# Patient Record
Sex: FEMALE | Race: WHITE | Employment: FULL TIME | URBAN - METROPOLITAN AREA
[De-identification: names, ages, dates, MRNs, and addresses within clinical notes are randomized per-mention and may not be internally consistent; named-entity substitution may affect disease eponyms.]

---

## 2017-01-02 DIAGNOSIS — I10 HYPERTENSION GOAL BP (BLOOD PRESSURE) < 140/90: Primary | ICD-10-CM

## 2017-01-02 RX ORDER — LISINOPRIL 10 MG/1
10 TABLET ORAL DAILY
Qty: 15 TABLET | Refills: 0 | Status: SHIPPED | OUTPATIENT
Start: 2017-01-02 | End: 2017-01-11

## 2017-01-02 NOTE — TELEPHONE ENCOUNTER
lisinopril (PRINIVIL,ZESTRIL) 10 MG tablet      Last Written Prescription Date: 11/26/16  Last Fill Quantity: 30, # refills: 0  Last Office Visit with G, P or [facility] prescribing provider: 3/8/16       POTASSIUM   Date Value Ref Range Status   01/15/2015 3.9 3.4 - 5.3 mmol/L Final     CREATININE   Date Value Ref Range Status   01/15/2015 0.81 0.52 - 1.04 mg/dL Final     BP Readings from Last 3 Encounters:   08/22/16 121/87   03/08/16 180/100   08/06/15 139/84

## 2017-01-02 NOTE — TELEPHONE ENCOUNTER
Routing refill request to provider for review/approval because:  Elva given x1 and patient did not follow up, please advise. Unable to reach patient per last refill request.  Please contact patient to schedule an appointment.   Isis Heck RN  Triage Nurse

## 2017-01-11 ENCOUNTER — DOCUMENTATION ONLY (OUTPATIENT)
Dept: FAMILY MEDICINE | Facility: CLINIC | Age: 57
End: 2017-01-11

## 2017-01-11 ENCOUNTER — OFFICE VISIT (OUTPATIENT)
Dept: FAMILY MEDICINE | Facility: CLINIC | Age: 57
End: 2017-01-11
Payer: COMMERCIAL

## 2017-01-11 VITALS
SYSTOLIC BLOOD PRESSURE: 130 MMHG | HEART RATE: 86 BPM | TEMPERATURE: 97.2 F | WEIGHT: 146.56 LBS | RESPIRATION RATE: 16 BRPM | OXYGEN SATURATION: 97 % | DIASTOLIC BLOOD PRESSURE: 72 MMHG | HEIGHT: 63 IN | BODY MASS INDEX: 25.97 KG/M2

## 2017-01-11 DIAGNOSIS — I10 HYPERTENSION GOAL BP (BLOOD PRESSURE) < 140/90: ICD-10-CM

## 2017-01-11 DIAGNOSIS — R73.01 IMPAIRED FASTING GLUCOSE: ICD-10-CM

## 2017-01-11 LAB — HBA1C MFR BLD: 5.2 % (ref 4.3–6)

## 2017-01-11 PROCEDURE — 99213 OFFICE O/P EST LOW 20 MIN: CPT | Performed by: PHYSICIAN ASSISTANT

## 2017-01-11 PROCEDURE — 82043 UR ALBUMIN QUANTITATIVE: CPT | Performed by: FAMILY MEDICINE

## 2017-01-11 PROCEDURE — 83036 HEMOGLOBIN GLYCOSYLATED A1C: CPT | Performed by: FAMILY MEDICINE

## 2017-01-11 PROCEDURE — 80053 COMPREHEN METABOLIC PANEL: CPT | Performed by: FAMILY MEDICINE

## 2017-01-11 PROCEDURE — 36415 COLL VENOUS BLD VENIPUNCTURE: CPT | Performed by: FAMILY MEDICINE

## 2017-01-11 RX ORDER — LISINOPRIL 10 MG/1
10 TABLET ORAL DAILY
Qty: 90 TABLET | Refills: 1 | Status: SHIPPED | OUTPATIENT
Start: 2017-01-11 | End: 2017-07-20

## 2017-01-11 NOTE — NURSING NOTE
"Chief Complaint   Patient presents with     Hypertension     Hypertension Follow Up        Initial /72 mmHg  Pulse 86  Temp(Src) 97.2  F (36.2  C) (Tympanic)  Resp 16  Ht 5' 2.5\" (1.588 m)  Wt 146 lb 9 oz (66.48 kg)  BMI 26.36 kg/m2  SpO2 97%  Breastfeeding? No Estimated body mass index is 26.36 kg/(m^2) as calculated from the following:    Height as of this encounter: 5' 2.5\" (1.588 m).    Weight as of this encounter: 146 lb 9 oz (66.48 kg).  BP completed using cuff size: large    Patient would like to be notified at the following phone number for results from this visit   [phone] OK to leave message or use Sharda Arias CMA (AAMA) 1/11/2017 11:20 AM      "

## 2017-01-11 NOTE — PROGRESS NOTES
Panel Management Review      Patient has the following on her problem list:     Hypertension   Last three blood pressure readings:  BP Readings from Last 3 Encounters:   08/22/16 121/87   03/08/16 180/100   08/06/15 139/84     Blood pressure: Failed    HTN Guidelines:  Age 18-59 BP range:  Less than 140/90  Age 60-85 with Diabetes:  Less than 140/90  Age 60-85 without Diabetes:  less than 150/90      Composite cancer screening  Chart review shows that this patient is due/due soon for the following Pap Smear and Mammogram  Summary:    Patient is due/failing the following:   MAMMOGRAM and PAP    Action needed:   Patient needs office visit for Mammogram & PAP.    Type of outreach:    Discussed in clinic with patient at time of office visit. Patient aware of need for Mammogram & PAP, did not want orders placed for Mammogram at this time due to work. Patient will schedule PAP with primary provider.    Questions for provider review:    None                                                                                                                                    Aster Arias CMA (AAMA) 1/11/2017 11:24 AM       Chart routed to N/A .

## 2017-01-11 NOTE — PROGRESS NOTES
SUBJECTIVE:                                                    Felecia Anne is a 56 year old female who presents to clinic today for the following health issues:      Hypertension Follow-up      Outpatient blood pressures are being checked at pharmacy at work.  Results are 135/75.    Low Salt Diet: Monitoring Salt Intake       Amount of exercise or physical activity: 4-5 days/week for an average of 45-60 minutes    Problems taking medications regularly: No    Medication side effects: none    Diet: low salt    Patient is here today to follow up on blood pressure  She is currently on Lisinopril for medication, tolerating well without side effects  No chest pain, shortness of breath, cough, headache or vision change, no leg swelling  Checks BP at work, gets 135/75 on average she states      Problem list and histories reviewed & adjusted, as indicated.  Additional history: as documented    Patient Active Problem List   Diagnosis     Hypertension goal BP (blood pressure) < 140/90     H. pylori infection     Lactose intolerance     CARDIOVASCULAR SCREENING; LDL GOAL LESS THAN 160     Impaired fasting glucose     Menopausal syndrome (hot flashes)     Increased BMI     History reviewed. No pertinent past surgical history.    Social History   Substance Use Topics     Smoking status: Current Every Day Smoker -- 1.00 packs/day for 33 years     Types: Cigarettes     Smokeless tobacco: Never Used     Alcohol Use: 12.6 oz/week     21 Standard drinks or equivalent per week      Comment: limited     Family History   Problem Relation Age of Onset     Hypertension Brother      Cancer - colorectal Mother          Current Outpatient Prescriptions   Medication Sig Dispense Refill     lisinopril (PRINIVIL/ZESTRIL) 10 MG tablet Take 1 tablet (10 mg) by mouth daily 90 tablet 1     FLUoxetine (PROZAC) 20 MG capsule Take 1 capsule (20 mg) by mouth daily 90 capsule 1     [DISCONTINUED] lisinopril (PRINIVIL/ZESTRIL) 10 MG tablet Take 1  "tablet (10 mg) by mouth daily 15 tablet 0     methocarbamol (ROBAXIN) 500 MG tablet 1-2 tabs q TID PRN for muscle spasm/pain 24 tablet 0     No Known Allergies    ROS:  Constitutional, HEENT, cardiovascular, pulmonary, gi and gu systems are negative, except as otherwise noted.    OBJECTIVE:                                                    /72 mmHg  Pulse 86  Temp(Src) 97.2  F (36.2  C) (Tympanic)  Resp 16  Ht 5' 2.5\" (1.588 m)  Wt 146 lb 9 oz (66.48 kg)  BMI 26.36 kg/m2  SpO2 97%  Breastfeeding? No  Body mass index is 26.36 kg/(m^2).  GENERAL: healthy, alert and no distress  NECK: no adenopathy, no asymmetry, masses, or scars and thyroid normal to palpation  RESP: lungs clear to auscultation - no rales, rhonchi or wheezes  CV: regular rate and rhythm, normal S1 S2, no S3 or S4, no murmur, click or rub, no peripheral edema and peripheral pulses strong  MS: no gross musculoskeletal defects noted, no edema    Diagnostic Test Results:  none      ASSESSMENT/PLAN:                                                            1. Hypertension goal BP (blood pressure) < 140/90  Chronic issue, stable, meds refilled and labs updated today.   - Comprehensive metabolic panel (BMP + Alb, Alk Phos, ALT, AST, Total. Bili, TP)  - Microalbumin quantitative, random urine  - lisinopril (PRINIVIL/ZESTRIL) 10 MG tablet; Take 1 tablet (10 mg) by mouth daily  Dispense: 90 tablet; Refill: 1    2. Impaired fasting glucose  - Hemoglobin A1c  - Comprehensive metabolic panel (BMP + Alb, Alk Phos, ALT, AST, Total. Bili, TP)  - Microalbumin quantitative, random urine    Risks, benefits and alternatives were discussed with patient. Agreeable to the plan of care.  Due for physical, HM, she will schedule this year.    Almita Mujica PA-C  [facility]"

## 2017-01-11 NOTE — MR AVS SNAPSHOT
After Visit Summary   1/11/2017    Felecia Anne    MRN: [id number]           Patient Information     Date Of Birth          1960        Visit Information        Provider Department      1/11/2017 11:30 AM Almita Mujica PA-C [facility]        Today's Diagnoses     Hypertension goal BP (blood pressure) < 140/90         Impaired fasting glucose            Follow-ups after your visit        Your next 10 appointments already scheduled     Jan 11, 2017 11:30 AM   Office Visit with Almita Mujica PA-C   [facility] ([facility]    [address] [address] [phone]           Bring a current list of meds and any records pertaining to this visit.  For Physicals, please bring immunization records and any forms needing to be filled out.  Please arrive 10 minutes early to complete paperwork.              Who to contact     If you have questions or need follow up information about today's clinic visit or your schedule please contact [facility] directly at [phone].  Normal or non-critical lab and imaging results will be communicated to you by [employer], letter or phone within 4 business days after the clinic has received the results. If you do not hear from us within 7 days, please contact the clinic through [employer] or phone. If you have a critical or abnormal lab result, we will notify you by phone as soon as possible.  Submit refill requests through [employer] or call your pharmacy and they will forward the refill request to us. Please allow 3 business days for your refill to be completed.          Additional Information About Your Visit        MyChart Information     [employer] gives you secure access to your electronic health record. If you see a primary care provider, you can also send messages to your care team and make appointments. If you have questions, please call your primary care clinic.   "If you do not have a primary care provider, please call [phone] and they will assist you.        Care EveryWhere ID     This is your Care EveryWhere ID. This could be used by other organizations to access your [place] medical records  [id number]        Your Vitals Were     Pulse Temperature Respirations    86 97.2  F (36.2  C) (Tympanic) 16    Height BMI (Body Mass Index) Pulse Oximetry    5' 2.5\" (1.588 m) 26.36 kg/m2 97%    Breastfeeding?          No         Blood Pressure from Last 3 Encounters:   01/11/17 130/72   08/22/16 121/87   03/08/16 180/100    Weight from Last 3 Encounters:   01/11/17 146 lb 9 oz (66.48 kg)   03/08/16 152 lb 14.4 oz (69.355 kg)   08/06/15 156 lb 11.2 oz (71.079 kg)              We Performed the Following     Comprehensive metabolic panel (BMP + Alb, Alk Phos, ALT, AST, Total. Bili, TP)     Hemoglobin A1c     Microalbumin quantitative, random urine          Where to get your medicines      These medications were sent to [id number] - [place], [place] - [address]  [address] [address]     Phone:  [phone]    - lisinopril 10 MG tablet       Primary Care Provider Office Phone # Fax #    Chasidy Kapoor [phone]       [facility] [address] [address]        Thank you!     Thank you for choosing [facility]  for your care. Our goal is always to provide you with excellent care. Hearing back from our patients is one way we can continue to improve our services. Please take a few minutes to complete the written survey that you may receive in the mail after your visit with us. Thank you!             Your Updated Medication List - Protect others around you: Learn how to safely use, store and throw away your medicines at www.disposemymeds.org.          This list is accurate as of: 1/11/17 11:26 AM.  Always use your most recent med list.                   Brand Name Dispense Instructions for use    " FLUoxetine 20 MG capsule    PROzac    90 capsule    Take 1 capsule (20 mg) by mouth daily       lisinopril 10 MG tablet    PRINIVIL/ZESTRIL    90 tablet    Take 1 tablet (10 mg) by mouth daily       methocarbamol 500 MG tablet    ROBAXIN    24 tablet    1-2 tabs q TID PRN for muscle spasm/pain

## 2017-01-12 LAB
ALBUMIN SERPL-MCNC: 3.6 G/DL (ref 3.4–5)
ALP SERPL-CCNC: 112 U/L (ref 40–150)
ALT SERPL W P-5'-P-CCNC: 22 U/L (ref 0–50)
ANION GAP SERPL CALCULATED.3IONS-SCNC: 7 MMOL/L (ref 3–14)
AST SERPL W P-5'-P-CCNC: 14 U/L (ref 0–45)
BILIRUB SERPL-MCNC: 0.3 MG/DL (ref 0.2–1.3)
BUN SERPL-MCNC: 10 MG/DL (ref 7–30)
CALCIUM SERPL-MCNC: 9.2 MG/DL (ref 8.5–10.1)
CHLORIDE SERPL-SCNC: 107 MMOL/L (ref 94–109)
CO2 SERPL-SCNC: 27 MMOL/L (ref 20–32)
CREAT SERPL-MCNC: 0.73 MG/DL (ref 0.52–1.04)
CREAT UR-MCNC: 258 MG/DL
GFR SERPL CREATININE-BSD FRML MDRD: 82 ML/MIN/1.7M2
GLUCOSE SERPL-MCNC: 102 MG/DL (ref 70–99)
MICROALBUMIN UR-MCNC: 15 MG/L
MICROALBUMIN/CREAT UR: 5.74 MG/G CR (ref 0–25)
POTASSIUM SERPL-SCNC: 3.7 MMOL/L (ref 3.4–5.3)
PROT SERPL-MCNC: 7 G/DL (ref 6.8–8.8)
SODIUM SERPL-SCNC: 141 MMOL/L (ref 133–144)

## 2017-03-08 DIAGNOSIS — F43.9 STRESS: ICD-10-CM

## 2017-03-08 DIAGNOSIS — N95.1 MENOPAUSAL SYNDROME (HOT FLASHES): ICD-10-CM

## 2017-03-09 NOTE — TELEPHONE ENCOUNTER
FLUoxetine (PROZAC) 20 MG     Last Written Prescription Date: 9/1/16  Last Fill Quantity: 90, # refills: 1  Last Office Visit with G primary care provider:  1/11/17        Last PHQ-9 score on record=   PHQ-9 SCORE 3/8/2016   Total Score -   Total Score 5

## 2017-03-10 NOTE — TELEPHONE ENCOUNTER
Prescription approved per [facility] Refill Protocol.  Isis Heck, RN  Triage Nurse

## 2017-04-24 ENCOUNTER — OFFICE VISIT (OUTPATIENT)
Dept: PEDIATRICS | Facility: CLINIC | Age: 57
End: 2017-04-24
Payer: COMMERCIAL

## 2017-04-24 ENCOUNTER — RADIANT APPOINTMENT (OUTPATIENT)
Dept: GENERAL RADIOLOGY | Facility: CLINIC | Age: 57
End: 2017-04-24
Attending: PHYSICIAN ASSISTANT
Payer: COMMERCIAL

## 2017-04-24 VITALS
RESPIRATION RATE: 20 BRPM | DIASTOLIC BLOOD PRESSURE: 60 MMHG | SYSTOLIC BLOOD PRESSURE: 114 MMHG | HEART RATE: 71 BPM | BODY MASS INDEX: 26.63 KG/M2 | HEIGHT: 63 IN | WEIGHT: 150.3 LBS | OXYGEN SATURATION: 94 % | TEMPERATURE: 98 F

## 2017-04-24 DIAGNOSIS — R05.9 COUGH: ICD-10-CM

## 2017-04-24 DIAGNOSIS — J20.9 ACUTE BRONCHITIS, UNSPECIFIED ORGANISM: Primary | ICD-10-CM

## 2017-04-24 PROCEDURE — 99214 OFFICE O/P EST MOD 30 MIN: CPT | Performed by: PHYSICIAN ASSISTANT

## 2017-04-24 PROCEDURE — 71020 XR CHEST 2 VW: CPT

## 2017-04-24 RX ORDER — AZITHROMYCIN 250 MG/1
TABLET, FILM COATED ORAL
Qty: 6 TABLET | Refills: 0 | Status: SHIPPED | OUTPATIENT
Start: 2017-04-24 | End: 2017-05-12

## 2017-04-24 NOTE — PROGRESS NOTES
"  SUBJECTIVE:                                                    Felecia Anne is a 57 year old female who presents to clinic today for the following health issues:    Acute Illness   Acute illness concerns: possible bronchitis   Onset: approximately 1 month ago    Fever: no     Chills/Sweats: no     Headache (location?): YES- with coughing fits    Sinus Pressure:no    Conjunctivitis:  no    Ear Pain: no    Rhinorrhea: YES- little    Congestion: YES    Sore Throat: YES- from coughing     Cough: YES-productive of milky sputum    Wheeze: no     Worse at night    Decreased Appetite: no     Nausea: no     Vomiting: YES- with coughing fits    Diarrhea:  no     Dysuria/Freq.: no     Fatigue/Achiness: YES- fatigue and achy    Sick/Strep Exposure: no      Therapies Tried and outcome: Cough drops and Mucinex  Smoker: <1ppd  Work: .  No history of asthma, allergies.     ROS:  ROS otherwise negative    OBJECTIVE:                                                    /60 (BP Location: Right arm, Cuff Size: Adult Regular)  Pulse 71  Temp 98  F (36.7  C) (Oral)  Resp 20  Ht 5' 2.5\" (1.588 m)  Wt 150 lb 4.8 oz (68.2 kg)  SpO2 94%  BMI 27.05 kg/m2  Body mass index is 27.05 kg/(m^2).   GENERAL: alert, no distress  HENT: ear canals- normal; TMs- normal; Nose- normal; Mouth- no ulcers, no lesions  NECK: no tenderness, no adenopathy  RESP: diminished breath sounds throughout; no rales, no rhonchi, no wheezes  CV: regular rates and rhythm, normal S1 S2, no S3 or S4 and no murmur, no click or rub    Diagnostic test results:  CXR appears NEGATIVE. Radiology review pending.  No results found for this or any previous visit (from the past 24 hour(s)).     ASSESSMENT/PLAN:                                                    (J20.9) Acute bronchitis, unspecified organism  (primary encounter diagnosis)  Comment: begin antibiotics. Limit tobacco use.   Plan: azithromycin (ZITHROMAX) 250 MG tablet          (R05) " Cough  Comment:   Plan: XR Chest 2 Views          See Patient Instructions    Perla Gonzalez PA-C  [facility]

## 2017-04-24 NOTE — PATIENT INSTRUCTIONS
Acute Bronchitis                  What is acute bronchitis?   Bronchitis is an infection of the air passages--that is, the tubes that connect the windpipe to the lungs. It causes swelling and irritation of the airways. With acute bronchitis you usually have a cough that produces phlegm and pain behind the breastbone when you breathe deeply or cough.   How does it occur?   Bronchitis often occurs with viral infections of the respiratory tract, such as colds and flu. Bronchitis may also be caused by bacterial infections. It may occur with childhood illnesses such as measles and whooping cough.   Attacks are most frequent during the winter or when the level of air pollution is high.   Infants, young children, older adults, smokers, and people with heart disease or lung disease (including asthma and allergies) are most likely to get acute bronchitis.   What are the symptoms?   Symptoms may include:   a deep cough that produces yellowish or greenish phlegm   pain behind the breastbone when you breathe deeply or cough   wheezing   feeling short of breath   fever   chills   headache   sore muscles.   How is it diagnosed?   Your healthcare provider will ask about your symptoms and examine you. You may have tests, such as:   a test of phlegm to look for bacteria   chest X-ray   blood tests.   How is it treated?   Acute bronchitis often does not require medical treatment. Resting at home and drinking plenty of fluids to keep the mucus loose may be all you need to do to get better in a few days. If your symptoms are severe or you have other health problems (such as heart or lung disease or diabetes), you may need to take antibiotics.   How long will the effects last?   Most of the time acute bronchitis clears up in a few days. Your cough may slowly get better in 1 to 2 weeks.   It may take you longer to recover if:   You are a smoker.   You live in an area where air pollution is a problem.   You have a heart  or lung disease.   You have any other continuing health problems.   How can I take care of myself?   You can help yourself by:   following the full treatment your healthcare provider recommends   using a vaporizer, humidifier, or steam from hot water to add moisture to the air   drinking plenty of liquids   taking cough medicine if recommended by your healthcare provider   resting in bed   taking aspirin or acetaminophen to reduce fever and relieve headache and muscle pain (Check with your healthcare provider before you give any medicine that contains aspirin or salicylates to a child or teen. This includes medicines like baby aspirin, some cold medicines, and Pepto Bismol. Children and teens who take aspirin are at risk for a serious illness called Reye's syndrome.)   eating healthy meals.   Call your healthcare provider if:   You have trouble breathing.   You have a fever of 101.5?F (38.6?C) or higher.   You cough up blood.   Your symptoms are getting worse instead of better.   You don't start to feel better after 3 days of treatment.   You have any symptoms that concern you.   How can I help prevent acute bronchitis?   To reduce your risk of getting a respiratory infection:   Do not smoke.   Wash your hands often, especially when you are around people with colds (upper respiratory infections).   If you have asthma or allergies, keep your symptoms under good control.   Get regular exercise.   Eat healthy foods.       Published by [employer]  This content is reviewed periodically and is subject to change as new health information becomes available. The information is intended to inform and educate and is not a replacement for medical evaluation, advice, diagnosis or treatment by a healthcare professional.   Developed by [employer]   ? 2010 [employer] and/or its affiliates. All Rights Reserved.   Copyright   [employer] 2011

## 2017-04-24 NOTE — MR AVS SNAPSHOT
After Visit Summary   4/24/2017    Felecia Anne    MRN: [id number]           Patient Information     Date Of Birth          1960        Visit Information        Provider Department      4/24/2017 3:50 PM Perla Gonzalez PA-C [facility]        Today's Diagnoses     Cough    -  1    Acute bronchitis, unspecified organism          Care Instructions                       Acute Bronchitis                  What is acute bronchitis?   Bronchitis is an infection of the air passages--that is, the tubes that connect the windpipe to the lungs. It causes swelling and irritation of the airways. With acute bronchitis you usually have a cough that produces phlegm and pain behind the breastbone when you breathe deeply or cough.   How does it occur?   Bronchitis often occurs with viral infections of the respiratory tract, such as colds and flu. Bronchitis may also be caused by bacterial infections. It may occur with childhood illnesses such as measles and whooping cough.   Attacks are most frequent during the winter or when the level of air pollution is high.   Infants, young children, older adults, smokers, and people with heart disease or lung disease (including asthma and allergies) are most likely to get acute bronchitis.   What are the symptoms?   Symptoms may include:   a deep cough that produces yellowish or greenish phlegm   pain behind the breastbone when you breathe deeply or cough   wheezing   feeling short of breath   fever   chills   headache   sore muscles.   How is it diagnosed?   Your healthcare provider will ask about your symptoms and examine you. You may have tests, such as:   a test of phlegm to look for bacteria   chest X-ray   blood tests.   How is it treated?   Acute bronchitis often does not require medical treatment. Resting at home and drinking plenty of fluids to keep the mucus loose may be all you need to do to get better in a few days. If your symptoms are  severe or you have other health problems (such as heart or lung disease or diabetes), you may need to take antibiotics.   How long will the effects last?   Most of the time acute bronchitis clears up in a few days. Your cough may slowly get better in 1 to 2 weeks.   It may take you longer to recover if:   You are a smoker.   You live in an area where air pollution is a problem.   You have a heart or lung disease.   You have any other continuing health problems.   How can I take care of myself?   You can help yourself by:   following the full treatment your healthcare provider recommends   using a vaporizer, humidifier, or steam from hot water to add moisture to the air   drinking plenty of liquids   taking cough medicine if recommended by your healthcare provider   resting in bed   taking aspirin or acetaminophen to reduce fever and relieve headache and muscle pain (Check with your healthcare provider before you give any medicine that contains aspirin or salicylates to a child or teen. This includes medicines like baby aspirin, some cold medicines, and Pepto Bismol. Children and teens who take aspirin are at risk for a serious illness called Reye's syndrome.)   eating healthy meals.   Call your healthcare provider if:   You have trouble breathing.   You have a fever of 101.5?F (38.6?C) or higher.   You cough up blood.   Your symptoms are getting worse instead of better.   You don't start to feel better after 3 days of treatment.   You have any symptoms that concern you.   How can I help prevent acute bronchitis?   To reduce your risk of getting a respiratory infection:   Do not smoke.   Wash your hands often, especially when you are around people with colds (upper respiratory infections).   If you have asthma or allergies, keep your symptoms under good control.   Get regular exercise.   Eat healthy foods.       Published by [employer].  This content is reviewed periodically and is subject to change as new health  information becomes available. The information is intended to inform and educate and is not a replacement for medical evaluation, advice, diagnosis or treatment by a healthcare professional.   Developed by [employer].   ? 2010 [facility] and/or its affiliates. All Rights Reserved.   Copyright   Clinical Reference Systems 2011              Follow-ups after your visit        Your next 10 appointments already scheduled     Apr 26, 2017  1:00 PM CDT   MA SCREENING DIGITAL BILATERAL with EAMA1   [facility] [place] [facility]    [address] ,[address]  [place] [address] [phone]           Do not use any powder, lotion or deodorant under your arms or on your breast. If you do, we will ask you to remove it before your exam.  Wear comfortable, two-piece clothing.  If you have any allergies, tell your care team.  Bring any previous mammograms from other facilities or have them mailed to the breast center.              Who to contact     If you have questions or need follow up information about today's clinic visit or your schedule please contact [facility] directly at [phone].  Normal or non-critical lab and imaging results will be communicated to you by Koziohart, letter or phone within 4 business days after the clinic has received the results. If you do not hear from us within 7 days, please contact the clinic through DosYogurest or phone. If you have a critical or abnormal lab result, we will notify you by phone as soon as possible.  Submit refill requests through 2sms or call your pharmacy and they will forward the refill request to us. Please allow 3 business days for your refill to be completed.          Additional Information About Your Visit        2sms Information     2sms gives you secure access to your electronic health record. If you see a primary care provider, you can also send messages to your care team and make appointments. If you have questions,  "please call your primary care clinic.  If you do not have a primary care provider, please call [phone] and they will assist you.        Care EveryWhere ID     This is your Care EveryWhere ID. This could be used by other organizations to access your [facility] medical records  [id number]        Your Vitals Were     Pulse Temperature Respirations Height Pulse Oximetry BMI (Body Mass Index)    71 98  F (36.7  C) (Oral) 20 5' 2.5\" (1.588 m) 94% 27.05 kg/m2       Blood Pressure from Last 3 Encounters:   04/24/17 114/60   01/11/17 130/72   08/22/16 121/87    Weight from Last 3 Encounters:   04/24/17 150 lb 4.8 oz (68.2 kg)   01/11/17 146 lb 9 oz (66.5 kg)   03/08/16 152 lb 14.4 oz (69.4 kg)                 Today's Medication Changes          These changes are accurate as of: 4/24/17  4:47 PM.  If you have any questions, ask your nurse or doctor.               Start taking these medicines.        Dose/Directions    azithromycin 250 MG tablet   Commonly known as:  ZITHROMAX   Used for:  Acute bronchitis, unspecified organism   Started by:  Perla Gonzalez PA-C        Two tablets first day, then one tablet daily for four days.   Quantity:  6 tablet   Refills:  0            Where to get your medicines      These medications were sent to [id number] - [place], [place] - [address]  [address] [address]     Phone:  [phone]     azithromycin 250 MG tablet                Primary Care Provider Office Phone # Fax #    Chasidy Kapoor [phone]       [facility] [address]  [facility] [address]        Thank you!     Thank you for choosing [facility]  for your care. Our goal is always to provide you with excellent care. Hearing back from our patients is one way we can continue to improve our services. Please take a few minutes to complete the written survey that you may receive in the mail after your visit with us. Thank you!           "   Your Updated Medication List - Protect others around you: Learn how to safely use, store and throw away your medicines at www.disposemymeds.org.          This list is accurate as of: 4/24/17  4:47 PM.  Always use your most recent med list.                   Brand Name Dispense Instructions for use    azithromycin 250 MG tablet    ZITHROMAX    6 tablet    Two tablets first day, then one tablet daily for four days.       FLUoxetine 20 MG capsule    PROzac    90 capsule    Take 1 capsule (20 mg) by mouth daily       lisinopril 10 MG tablet    PRINIVIL/ZESTRIL    90 tablet    Take 1 tablet (10 mg) by mouth daily       methocarbamol 500 MG tablet    ROBAXIN    24 tablet    1-2 tabs q TID PRN for muscle spasm/pain

## 2017-04-24 NOTE — NURSING NOTE
"Chief Complaint   Patient presents with     Cough       Initial /60 (BP Location: Right arm, Cuff Size: Adult Regular)  Pulse 71  Temp 98  F (36.7  C) (Oral)  Resp 20  Ht 5' 2.5\" (1.588 m)  Wt 150 lb 4.8 oz (68.2 kg)  SpO2 94%  BMI 27.05 kg/m2 Estimated body mass index is 27.05 kg/(m^2) as calculated from the following:    Height as of this encounter: 5' 2.5\" (1.588 m).    Weight as of this encounter: 150 lb 4.8 oz (68.2 kg).  Medication Reconciliation: complete   Jordana De La O MA      "

## 2017-04-26 ENCOUNTER — RADIANT APPOINTMENT (OUTPATIENT)
Dept: MAMMOGRAPHY | Facility: CLINIC | Age: 57
End: 2017-04-26
Payer: COMMERCIAL

## 2017-04-26 DIAGNOSIS — Z12.31 VISIT FOR SCREENING MAMMOGRAM: ICD-10-CM

## 2017-04-26 PROCEDURE — G0202 SCR MAMMO BI INCL CAD: HCPCS | Mod: TC

## 2017-04-28 ENCOUNTER — TELEPHONE (OUTPATIENT)
Dept: PEDIATRICS | Facility: CLINIC | Age: 57
End: 2017-04-28

## 2017-04-28 DIAGNOSIS — J20.9 ACUTE BRONCHITIS, UNSPECIFIED ORGANISM: Primary | ICD-10-CM

## 2017-04-28 RX ORDER — PREDNISONE 20 MG/1
60 TABLET ORAL DAILY
Qty: 15 TABLET | Refills: 0 | Status: SHIPPED | OUTPATIENT
Start: 2017-04-28 | End: 2018-06-08

## 2017-04-28 NOTE — TELEPHONE ENCOUNTER
Pt provides the update to KAVIN Smith:     - took the last dose of z-pack this am  - all her sx's are the same, didn't get any better or worse  - still has wet cough with lot of yellow phlegm & fatigue  - no new sx's  - denies fever, chills, wheezing, SOB, chest congestion, HA, dizziness, GI sx's, ear pain, ST or dehydration sx's  - says that she was advised by Taylor to update us if her sx's aren't better    Please advise. Pt can be reached at [phone](OK to LM).    Notes from 04/25:  Acute bronchitis, unspecified organism (primary encounter diagnosis)  Comment: begin antibiotics. Limit tobacco use.   Plan: azithromycin (ZITHROMAX) 250 MG tablet    Rich RN  Triage Nurse

## 2017-04-28 NOTE — TELEPHONE ENCOUNTER
Will start oral prednisone. rx sent.   Call next week if no improvement.   Perla Gonzalez PA-C

## 2017-05-04 ENCOUNTER — TELEPHONE (OUTPATIENT)
Dept: FAMILY MEDICINE | Facility: CLINIC | Age: 57
End: 2017-05-04

## 2017-05-04 DIAGNOSIS — Z12.11 SCREEN FOR COLON CANCER: Primary | ICD-10-CM

## 2017-05-04 DIAGNOSIS — Z80.0 FAMILY HISTORY OF COLON CANCER: ICD-10-CM

## 2017-05-04 NOTE — TELEPHONE ENCOUNTER
Patient's mother had colon cancer.  She would like to have a screening colonoscopy done.  If appropriate, please place order. GI  please contact patient.    Agnieszka MONTANO    [facility]

## 2017-05-09 DIAGNOSIS — J20.9 ACUTE BRONCHITIS, UNSPECIFIED ORGANISM: ICD-10-CM

## 2017-05-10 NOTE — TELEPHONE ENCOUNTER
I have not seen her in over a year.  Someone else has seen her for this problem and phoned in the prescription; could direct there.    I am not there today nor the rest of the week; someone else may otherwise need to see her.     Thanks!

## 2017-05-11 RX ORDER — PREDNISONE 20 MG/1
TABLET ORAL
Qty: 15 TABLET | Refills: 0 | OUTPATIENT
Start: 2017-05-11

## 2017-05-11 NOTE — TELEPHONE ENCOUNTER
Called patient to notify her she will need a follow up appointment if she feels she needs a refill.  Left message at her home number to call for an appointment.     Isis Heck, RN  Triage Nurse

## 2017-05-12 ENCOUNTER — OFFICE VISIT (OUTPATIENT)
Dept: FAMILY MEDICINE | Facility: CLINIC | Age: 57
End: 2017-05-12
Payer: COMMERCIAL

## 2017-05-12 VITALS — TEMPERATURE: 98.1 F | WEIGHT: 145.4 LBS | HEART RATE: 82 BPM | OXYGEN SATURATION: 96 % | BODY MASS INDEX: 26.17 KG/M2

## 2017-05-12 DIAGNOSIS — J20.9 ACUTE BRONCHITIS WITH SYMPTOMS > 10 DAYS: Primary | ICD-10-CM

## 2017-05-12 PROCEDURE — 99213 OFFICE O/P EST LOW 20 MIN: CPT | Performed by: NURSE PRACTITIONER

## 2017-05-12 RX ORDER — PREDNISONE 20 MG/1
20 TABLET ORAL DAILY
Qty: 5 TABLET | Refills: 0 | Status: SHIPPED | OUTPATIENT
Start: 2017-05-12 | End: 2018-06-08

## 2017-05-12 RX ORDER — PREDNISONE 20 MG/1
40 TABLET ORAL DAILY
Qty: 10 TABLET | Refills: 0 | Status: SHIPPED | OUTPATIENT
Start: 2017-05-12 | End: 2017-05-17

## 2017-05-12 NOTE — PROGRESS NOTES
SUBJECTIVE:                                                    Felecia Anne is a 57 year old female who presents to clinic today for the following health issues:      ED/UC Followup:    Facility:  [facility]  Date of visit: 5/9/17  Reason for visit: Cough  Current Status: Still coughing and a lot of phleghm     Pt presents for follow up.  She was seen previously in UC, 4/24/17.  Took zpack as directed, no help.  She called back and was given 6 days of prednisone 60mg per day.  She took this and felt a drastic improvement until she stopped the med.  Stopped a few days ago.  Now with cough, phlegm.  No fever.  No vomiting or GI symptoms.  No chest pain, palpitations, sob.  She continues to smoke.    Not quite ready to quit.  She does note she has sob with exertion, at baseline.    Problem list and histories reviewed & adjusted, as indicated.  Additional history: as documented    Patient Active Problem List   Diagnosis     Hypertension goal BP (blood pressure) < 140/90     H. pylori infection     Lactose intolerance     CARDIOVASCULAR SCREENING; LDL GOAL LESS THAN 160     Impaired fasting glucose     Menopausal syndrome (hot flashes)     Increased BMI     History reviewed. No pertinent surgical history.    Social History   Substance Use Topics     Smoking status: Current Every Day Smoker     Packs/day: 1.00     Years: 33.00     Types: Cigarettes     Smokeless tobacco: Never Used     Alcohol use 12.6 oz/week     21 Standard drinks or equivalent per week      Comment: limited     Family History   Problem Relation Age of Onset     Hypertension Brother      Cancer - colorectal Mother            Reviewed and updated as needed this visit by clinical staff       Reviewed and updated as needed this visit by Provider         ROS:  SEE HPI.    OBJECTIVE:                                                    Pulse 82  Temp 98.1  F (36.7  C) (Oral)  Wt 145 lb 6.4 oz (66 kg)  SpO2 96%  BMI 26.17 kg/m2  Body mass index  is 26.17 kg/(m^2).  GENERAL: healthy, alert and no distress  EYES: Eyes grossly normal to inspection  HENT: ear canals and TM's normal, nose and mouth without ulcers or lesions  NECK: no adenopathy, no asymmetry, masses, or scars and thyroid normal to palpation  RESP: lungs clear to auscultation - no rales, rhonchi or wheezes  CV: regular rate and rhythm, normal S1 S2, no S3 or S4, no murmur, click or rub, no peripheral edema and peripheral pulses strong  PSYCH: mentation appears normal, affect normal/bright    Diagnostic Test Results:  none      ASSESSMENT/PLAN:                                                    1. Acute bronchitis with symptoms > 10 days  57 y.o. Female, current smoker, hx of bronchitis symptoms, treated with azithromycin and steroid burst.  Improvement with steroid burst, but returned shortly after completion.  Discussed options.  Restart prednisone, longer taper provided.  Monitor.  Should see PCP for follow up- wondering if there is some underlying copd.  Pt agrees with plan and verbalized understanding.  - predniSONE (DELTASONE) 20 MG tablet; Take 2 tablets (40 mg) by mouth daily for 5 days  Dispense: 10 tablet; Refill: 0  - predniSONE (DELTASONE) 20 MG tablet; Take 1 tablet (20 mg) by mouth daily  Dispense: 5 tablet; Refill: 0    Offered tobacco cessation assistance- pre contemplative.    SAHRA Gomez Ra [facility]

## 2017-05-12 NOTE — MR AVS SNAPSHOT
After Visit Summary   5/12/2017    Felecia Anne    MRN: [id number]           Patient Information     Date Of Birth          1960        Visit Information        Provider Department      5/12/2017 11:00 AM Heaven Connell Ra, APRN CNP [facility]        Today's Diagnoses     Acute bronchitis with symptoms > 10 days    -  1      Care Instructions    Restart the prednisone now.  Complete the taper and then return to talk with Dr. Kapoor.        Follow-ups after your visit        Who to contact     If you have questions or need follow up information about today's clinic visit or your schedule please contact [facility] directly at [phone].  Normal or non-critical lab and imaging results will be communicated to you by MyChart, letter or phone within 4 business days after the clinic has received the results. If you do not hear from us within 7 days, please contact the clinic through Sqor Sportshart or phone. If you have a critical or abnormal lab result, we will notify you by phone as soon as possible.  Submit refill requests through BrightQube or call your pharmacy and they will forward the refill request to us. Please allow 3 business days for your refill to be completed.          Additional Information About Your Visit        MyChart Information     BrightQube gives you secure access to your electronic health record. If you see a primary care provider, you can also send messages to your care team and make appointments. If you have questions, please call your primary care clinic.  If you do not have a primary care provider, please call [phone] and they will assist you.        Care EveryWhere ID     This is your Care EveryWhere ID. This could be used by other organizations to access your [facility] medical records  [id number]        Your Vitals Were     Pulse Temperature Pulse Oximetry BMI (Body Mass Index)          82 98.1  F (36.7  C) (Oral) 96% 26.17 kg/m2         Blood  Pressure from Last 3 Encounters:   04/24/17 114/60   01/11/17 130/72   08/22/16 121/87    Weight from Last 3 Encounters:   05/12/17 145 lb 6.4 oz (66 kg)   04/24/17 150 lb 4.8 oz (68.2 kg)   01/11/17 146 lb 9 oz (66.5 kg)              Today, you had the following     No orders found for display         Today's Medication Changes          These changes are accurate as of: 5/12/17 11:30 AM.  If you have any questions, ask your nurse or doctor.               These medicines have changed or have updated prescriptions.        Dose/Directions    * predniSONE 20 MG tablet   Commonly known as:  DELTASONE   This may have changed:  Another medication with the same name was added. Make sure you understand how and when to take each.   Used for:  Acute bronchitis, unspecified organism   Changed by:  Chasidy Kapoor MD        Dose:  60 mg   Take 3 tablets (60 mg) by mouth daily   Quantity:  15 tablet   Refills:  0       * predniSONE 20 MG tablet   Commonly known as:  DELTASONE   This may have changed:  You were already taking a medication with the same name, and this prescription was added. Make sure you understand how and when to take each.   Used for:  Acute bronchitis with symptoms > 10 days   Changed by:  Heaven Connell Ra, APRN CNP        Dose:  40 mg   Take 2 tablets (40 mg) by mouth daily for 5 days   Quantity:  10 tablet   Refills:  0       * predniSONE 20 MG tablet   Commonly known as:  DELTASONE   This may have changed:  You were already taking a medication with the same name, and this prescription was added. Make sure you understand how and when to take each.   Used for:  Acute bronchitis with symptoms > 10 days   Changed by:  Heaven Connell Ra, APRN CNP        Dose:  20 mg   Take 1 tablet (20 mg) by mouth daily   Quantity:  5 tablet   Refills:  0       * Notice:  This list has 3 medication(s) that are the same as other medications prescribed for you. Read the directions carefully, and ask your doctor or other care  provider to review them with you.         Where to get your medicines      These medications were sent to [id number] - [place], [place] - [address]  [address], [place] [address]     Phone:  [phone]     predniSONE 20 MG tablet    predniSONE 20 MG tablet                Primary Care Provider Office Phone # Fax #    Chasidy Kapoor [phone]       [facility] [address]  [facility] [address]        Thank you!     Thank you for choosing [facility]  for your care. Our goal is always to provide you with excellent care. Hearing back from our patients is one way we can continue to improve our services. Please take a few minutes to complete the written survey that you may receive in the mail after your visit with us. Thank you!             Your Updated Medication List - Protect others around you: Learn how to safely use, store and throw away your medicines at www.disposemymeds.org.          This list is accurate as of: 5/12/17 11:30 AM.  Always use your most recent med list.                   Brand Name Dispense Instructions for use    FLUoxetine 20 MG capsule    PROzac    90 capsule    Take 1 capsule (20 mg) by mouth daily       lisinopril 10 MG tablet    PRINIVIL/ZESTRIL    90 tablet    Take 1 tablet (10 mg) by mouth daily       * predniSONE 20 MG tablet    DELTASONE    15 tablet    Take 3 tablets (60 mg) by mouth daily       * predniSONE 20 MG tablet    DELTASONE    10 tablet    Take 2 tablets (40 mg) by mouth daily for 5 days       * predniSONE 20 MG tablet    DELTASONE    5 tablet    Take 1 tablet (20 mg) by mouth daily       * Notice:  This list has 3 medication(s) that are the same as other medications prescribed for you. Read the directions carefully, and ask your doctor or other care provider to review them with you.

## 2017-05-12 NOTE — LETTER
[facility]  [address] [address]  Phone: [phone]    May 12, 2017        Felecia Anne  [address] [place] [address]          To whom it may concern:    RE: Felecia Anne    Patient was seen and treated today at our clinic.  Please excuse from work 5/12/17      Please contact me for questions or concerns.      Sincerely,        SAHRA Gomez Ra CNP

## 2017-05-12 NOTE — NURSING NOTE
"Chief Complaint   Patient presents with     URI       Initial Pulse 82  Temp 98.1  F (36.7  C) (Oral)  Wt 145 lb 6.4 oz (66 kg)  SpO2 96%  BMI 26.17 kg/m2 Estimated body mass index is 26.17 kg/(m^2) as calculated from the following:    Height as of 4/24/17: 5' 2.5\" (1.588 m).    Weight as of this encounter: 145 lb 6.4 oz (66 kg).  Medication Reconciliation: complete   Roxann VALDERRAMA M.A.      "

## 2017-06-10 ENCOUNTER — HEALTH MAINTENANCE LETTER (OUTPATIENT)
Age: 57
End: 2017-06-10

## 2017-07-20 DIAGNOSIS — I10 HYPERTENSION GOAL BP (BLOOD PRESSURE) < 140/90: ICD-10-CM

## 2017-07-20 NOTE — TELEPHONE ENCOUNTER
lisinopril (PRINIVIL/ZESTRIL) 10 MG      Last Written Prescription Date: 11/11/17  Last Fill Quantity: 90, # refills: 1  Last Office Visit with G, P or [facility] prescribing provider: 5/12/17       Potassium   Date Value Ref Range Status   01/11/2017 3.7 3.4 - 5.3 mmol/L Final     Creatinine   Date Value Ref Range Status   01/11/2017 0.73 0.52 - 1.04 mg/dL Final     BP Readings from Last 3 Encounters:   04/24/17 114/60   01/11/17 130/72   08/22/16 121/87

## 2017-07-21 RX ORDER — LISINOPRIL 10 MG/1
TABLET ORAL
Qty: 90 TABLET | Refills: 1 | Status: SHIPPED | OUTPATIENT
Start: 2017-07-21 | End: 2018-06-08

## 2017-07-21 NOTE — TELEPHONE ENCOUNTER
Prescription approved per [facility] Refill Protocol.  Isis Heck, RN  Triage Nurse

## 2017-07-24 ENCOUNTER — TELEPHONE (OUTPATIENT)
Dept: FAMILY MEDICINE | Facility: CLINIC | Age: 57
End: 2017-07-24

## 2017-07-24 NOTE — TELEPHONE ENCOUNTER
Panel Management Review      Patient has the following on her problem list:     Hypertension   Last three blood pressure readings:  BP Readings from Last 3 Encounters:   04/24/17 114/60   01/11/17 130/72   08/22/16 121/87     Blood pressure: Passed    HTN Guidelines:  Age 18-59 BP range:  Less than 140/90  Age 60-85 with Diabetes:  Less than 140/90  Age 60-85 without Diabetes:  less than 150/90        Composite cancer screening  Chart review shows that this patient is due/due soon for the following Colonoscopy and Fecal Colorectal (FIT)  Summary:    Patient is due/failing the following:   COLONOSCOPY and FIT    Action needed:   Patient needs referral/order: Colonoscopy or FIT    Type of outreach:    Sent letter.    Questions for provider review:    None                                                                                                                                    Roxann VALDERRAMA M.A.       Chart routed to Care Team .

## 2017-07-24 NOTE — LETTER
[facility]  [address] [address]  Phone: [phone]  July 24, 2017      Felecia Anne  [address]  [place] [address]      Dear Felecia,    We care about your health and have reviewed your health plan including your medical conditions, medications, and lab results.  Based on this review, it is recommended that you follow up regarding the following health topic(s):  -Colon Cancer Screening    We recommend you take the following action(s):  -schedule a COLONOSCOPY to look for colon cancer (due every 10 years or 5 years in higher risk situations.)  Colonoscopies can prevent 90-95% of colon cancer deaths.  Problem lesions can be removed before they ever become cancer.  If you do not wish to do a colonoscopy or cannot afford to do one at this time, there is another option called a Fecal Immunochemical Occult Blood Test (FIT) a take home stool sample kit.  It does not replace the colonoscopy for colorectal cancer screening, but it can detect hidden bleeding in the lower colon.  It does need to be repeated every year and if a positive result is obtained, you would be referred for a colonoscopy.  If you have completed either one of these tests at another facility, please have the records sent to our clinic for our records.     Please call us at the [facility] (or use [employer]) to address the above recommendations.     Thank you for trusting [facility] and we appreciate the opportunity to serve you.  We look forward to supporting your healthcare needs in the future.    Healthy Regards,    Your Health Care Team  [facility]

## 2017-09-01 DIAGNOSIS — N95.1 MENOPAUSAL SYNDROME (HOT FLASHES): ICD-10-CM

## 2017-09-01 DIAGNOSIS — F43.9 STRESS: ICD-10-CM

## 2017-09-01 NOTE — TELEPHONE ENCOUNTER
FLUoxetine (PROZAC) 20 MG     Last Written Prescription Date: 3/10/17  Last Fill Quantity: 90, # refills: 1  Last Office Visit with G primary care provider:  5/12/17        Last PHQ-9 score on record=   PHQ-9 SCORE 3/8/2016   Total Score -   Total Score 5

## 2017-09-05 NOTE — TELEPHONE ENCOUNTER
Prescription approved per [facility] Refill Protocol. Takes for hot flashes.

## 2017-10-02 ENCOUNTER — TELEPHONE (OUTPATIENT)
Dept: FAMILY MEDICINE | Facility: CLINIC | Age: 57
End: 2017-10-02

## 2017-11-13 ENCOUNTER — TELEPHONE (OUTPATIENT)
Dept: FAMILY MEDICINE | Facility: CLINIC | Age: 57
End: 2017-11-13

## 2017-11-13 NOTE — LETTER
November 13, 2017      Felecia Anne  [address]  [place] [address]        Dear Felecia,       We care about your health and have reviewed your health plan including your medical conditions, medications, and lab results.  Based on this review, it is recommended that you follow up regarding the following health topic(s):  -Colon Cancer Screening  -Cervical Cancer Screening    We recommend you take the following action(s):  -schedule a COLONOSCOPY to look for colon cancer (due every 10 years or 5 years in higher risk situations.)  Colonoscopies can prevent 90-95% of colon cancer deaths.  Problem lesions can be removed before they ever become cancer.  If you do not wish to do a colonoscopy or cannot afford to do one at this time, there is another option called a Fecal Immunochemical Occult Blood Test (FIT) a take home stool sample kit.  It does not replace the colonoscopy for colorectal cancer screening, but it can detect hidden bleeding in the lower colon.  It does need to be repeated every year and if a positive result is obtained, you would be referred for a colonoscopy.  If you have completed either one of these tests at another facility, please have the records sent to our clinic for our records.  -schedule a PAP SMEAR EXAM which is due.  Please disregard this reminder if you have had this exam elsewhere within the last year.  It would be helpful for us to have a copy of your recent pap smear report to update your records.     Please call us at the [facility] - [phone] (or use [employer]) to address the above recommendations.     Thank you for trusting [facility] and we appreciate the opportunity to serve you.  We look forward to supporting your healthcare needs in the future.    Healthy Regards,    Your Health Care Team  [facility]      Sincerely,              SAHRA Gomez Ra CNP

## 2017-11-13 NOTE — TELEPHONE ENCOUNTER
Panel Management Review      Patient has the following on her problem list:     Hypertension   Last three blood pressure readings:  BP Readings from Last 3 Encounters:   04/24/17 114/60   01/11/17 130/72   08/22/16 121/87     Blood pressure: Passed    HTN Guidelines:  Age 18-59 BP range:  Less than 140/90  Age 60-85 with Diabetes:  Less than 140/90  Age 60-85 without Diabetes:  less than 150/90        Composite cancer screening  Chart review shows that this patient is due/due soon for the following Pap Smear, Colonoscopy and Fecal Colorectal (FIT)  Summary:    Patient is due/failing the following:   COLONOSCOPY, FIT and PAP    Action needed:   Patient needs office visit for PX.    Type of outreach:    Sent letter.    Questions for provider review:    None                                                                                                                                    Roxann VALDERRAMA M.A.       Chart routed to Care Team .

## 2018-02-26 ENCOUNTER — TELEPHONE (OUTPATIENT)
Dept: FAMILY MEDICINE | Facility: CLINIC | Age: 58
End: 2018-02-26

## 2018-02-26 NOTE — TELEPHONE ENCOUNTER
Type of outreach:  Tried to call but number disconnected.  Health Maintenance Due   Topic Date Due     HEPATITIS C SCREENING  04/21/1979     COLONOSCOPY Q5 YR  04/21/1981     ADVANCE DIRECTIVE PLANNING Q5 YRS  04/21/2016     PAP SCREENING Q3 YR (SYSTEM ASSIGNED)  11/21/2016     INFLUENZA VACCINE (SYSTEM ASSIGNED)  09/01/2017     Roxann VALDERRAMA M.A.

## 2018-06-08 ENCOUNTER — OFFICE VISIT (OUTPATIENT)
Dept: FAMILY MEDICINE | Facility: CLINIC | Age: 58
End: 2018-06-08
Payer: COMMERCIAL

## 2018-06-08 VITALS
TEMPERATURE: 98.1 F | HEART RATE: 70 BPM | WEIGHT: 145.5 LBS | DIASTOLIC BLOOD PRESSURE: 86 MMHG | HEIGHT: 61 IN | BODY MASS INDEX: 27.47 KG/M2 | RESPIRATION RATE: 16 BRPM | SYSTOLIC BLOOD PRESSURE: 124 MMHG | OXYGEN SATURATION: 98 %

## 2018-06-08 DIAGNOSIS — K92.1 BLOOD IN STOOL: Primary | ICD-10-CM

## 2018-06-08 DIAGNOSIS — Z12.11 SCREEN FOR COLON CANCER: ICD-10-CM

## 2018-06-08 PROCEDURE — 99213 OFFICE O/P EST LOW 20 MIN: CPT | Performed by: PHYSICIAN ASSISTANT

## 2018-06-08 NOTE — PROGRESS NOTES
SUBJECTIVE:   Felecia Anne is a 58 year old female who presents to clinic today for the following health issues:      Blood in Stool      Duration: Random times, first time was 3 months and another time a couple weeks ago    Description (location/character/radiation): both times were bright red and about a quarter size of blood    Intensity:  mild    Accompanying signs and symptoms: second time she noticed it she had some mild abdominal pressure; did NOT have any constipation, diarrhea or abdominal pain either time she noticed it    History (similar episodes/previous evaluation): no previous eval; patient is worried since her mom has colon cancer; patient has not had a colonoscopy before    Precipitating or alleviating factors: None    Therapies tried and outcome: None     Patient is here today for evaluation of blood in the stool  She notes bright red blood on stool with bowel movement 3 months ago and then again last week  Denies constipation, bloating, change in stool caliber  No weight loss  Does have family history of colon cancer in mother    Problem list and histories reviewed & adjusted, as indicated.  Additional history: as documented    Patient Active Problem List   Diagnosis     Hypertension goal BP (blood pressure) < 140/90     H. pylori infection     Lactose intolerance     CARDIOVASCULAR SCREENING; LDL GOAL LESS THAN 160     Impaired fasting glucose     Menopausal syndrome (hot flashes)     Increased BMI     History reviewed. No pertinent surgical history.    Social History   Substance Use Topics     Smoking status: Current Every Day Smoker     Packs/day: 1.00     Years: 33.00     Types: Cigarettes     Smokeless tobacco: Never Used     Alcohol use 12.6 oz/week     21 Standard drinks or equivalent per week      Comment: limited     Family History   Problem Relation Age of Onset     Hypertension Brother      Cancer - colorectal Mother          No current outpatient prescriptions on file.  "    Allergies   Allergen Reactions     Lactose GI Disturbance       Reviewed and updated as needed this visit by clinical staff  Tobacco  Allergies  Meds  Med Hx  Surg Hx  Fam Hx  Soc Hx      Reviewed and updated as needed this visit by Provider         ROS:  Constitutional, HEENT, cardiovascular, pulmonary, gi and gu systems are negative, except as otherwise noted.    OBJECTIVE:     /86 (BP Location: Right arm, Patient Position: Chair, Cuff Size: Adult Regular)  Pulse 70  Temp 98.1  F (36.7  C) (Oral)  Resp 16  Ht 5' 1\" (1.549 m)  Wt 145 lb 8 oz (66 kg)  SpO2 98%  Breastfeeding? No  BMI 27.49 kg/m2  Body mass index is 27.49 kg/(m^2).  GENERAL: healthy, alert and no distress  NECK: no adenopathy, no asymmetry, masses, or scars and thyroid normal to palpation  RESP: lungs clear to auscultation - no rales, rhonchi or wheezes  CV: regular rate and rhythm, normal S1 S2, no S3 or S4, no murmur, click or rub, no peripheral edema and peripheral pulses strong  ABDOMEN: soft, nontender, no hepatosplenomegaly, no masses and bowel sounds normal  RECTAL (male): normal sphincter tone, no rectal masses, prostate normal size, smooth, nontender without nodules or masses  MS: no gross musculoskeletal defects noted, no edema    Diagnostic Test Results:  none     ASSESSMENT/PLAN:             1. Blood in stool  New problem, suspect hemorrhoid issue. Recommend increased fiber diet, fluids and sitz baths as needed. Referral placed for colonoscopy as is overdue and family history of colon cancer.    2. Screen for colon cancer  - GASTROENTEROLOGY ADULT REF PROCEDURE ONLY [facility] [phone]; [facility]    Risks, benefits and alternatives were discussed with patient. Agreeable to the plan of care.      Almita Mujica PA-C  [facility]  "

## 2018-06-08 NOTE — MR AVS SNAPSHOT
After Visit Summary   6/8/2018    Felecia Anne    MRN: [id number]           Patient Information     Date Of Birth          1960        Visit Information        Provider Department      6/8/2018 9:10 AM Almita Mujica PA-C [facility]        Today's Diagnoses     Blood in stool    -  1    Screen for colon cancer           Follow-ups after your visit        Additional Services     GASTROENTEROLOGY ADULT REF PROCEDURE ONLY [facility] [phone]; [facility]       Last Lab Result: Creatinine (mg/dL)       Date                     Value                 01/11/2017               0.73             ----------  There is no height or weight on file to calculate BMI.     Needed:  No  Language:  English    Patient will be contacted to schedule procedure.     Please be aware that coverage of these services is subject to the terms and limitations of your health insurance plan.  Call member services at your health plan with any benefit or coverage questions.  Any procedures must be performed at a [place] facility OR coordinated by your clinic's referral office.    Please bring the following with you to your appointment:    (1) Any X-Rays, CTs or MRIs which have been performed.  Contact the facility where they were done to arrange for  prior to your scheduled appointment.    (2) List of current medications   (3) This referral request   (4) Any documents/labs given to you for this referral                  Follow-up notes from your care team     Return in about 4 weeks (around 7/6/2018) for Physical Exam with pap.      Your next 10 appointments already scheduled     Jul 10, 2018  9:10 AM CDT   PHYSICAL with Almita Mujica PA-C   [facility] ([facility])    [address] [address] [phone]              Who to contact     If you have questions or need follow up information about today's  "clinic visit or your schedule please contact [facility] directly at [phone].  Normal or non-critical lab and imaging results will be communicated to you by MyChart, letter or phone within 4 business days after the clinic has received the results. If you do not hear from us within 7 days, please contact the clinic through [email or web address] or phone. If you have a critical or abnormal lab result, we will notify you by phone as soon as possible.  Submit refill requests through [employer] or call your pharmacy and they will forward the refill request to us. Please allow 3 business days for your refill to be completed.          Additional Information About Your Visit        [email or web address] Information     [employer] gives you secure access to your electronic health record. If you see a primary care provider, you can also send messages to your care team and make appointments. If you have questions, please call your primary care clinic.  If you do not have a primary care provider, please call [phone] and they will assist you.        Care EveryWhere ID     This is your Care EveryWhere ID. This could be used by other organizations to access your [facility] medical records  [id number]        Your Vitals Were     Pulse Temperature Respirations Height Pulse Oximetry Breastfeeding?    70 98.1  F (36.7  C) (Oral) 16 5' 1\" (1.549 m) 98% No    BMI (Body Mass Index)                   27.49 kg/m2            Blood Pressure from Last 3 Encounters:   06/08/18 124/86   04/24/17 114/60   01/11/17 130/72    Weight from Last 3 Encounters:   06/08/18 145 lb 8 oz (66 kg)   05/12/17 145 lb 6.4 oz (66 kg)   04/24/17 150 lb 4.8 oz (68.2 kg)              We Performed the Following     GASTROENTEROLOGY ADULT REF PROCEDURE ONLY [facility] [phone]; [facility]        Primary Care Provider Office Phone # Fax #    Chasidy Kapoor [phone]       [address]  [facility] [address]        Equal Access to Services     " HOWARD YOU : Hadii aad ku hadadairterri Montserrat, wanadiada luqadaha, qaybta kaalmada sadafbenjamíncasa, chava lopezmuralijennifer norris. So [facility] [phone].    ATENCIÓN: Si habla español, tiene a santoro disposición servicios gratuitos de asistencia lingüística. Llame al [phone].    We comply with applicable federal civil rights laws and [place] laws. We do not discriminate on the basis of race, color, national origin, age, disability, sex, sexual orientation, or gender identity.            Thank you!     Thank you for choosing [facility]  for your care. Our goal is always to provide you with excellent care. Hearing back from our patients is one way we can continue to improve our services. Please take a few minutes to complete the written survey that you may receive in the mail after your visit with us. Thank you!             Your Updated Medication List - Protect others around you: Learn how to safely use, store and throw away your medicines at www.disposemymeds.org.      Notice  As of 6/8/2018  9:27 AM    You have not been prescribed any medications.

## 2018-07-10 ENCOUNTER — MYC MEDICAL ADVICE (OUTPATIENT)
Dept: FAMILY MEDICINE | Facility: CLINIC | Age: 58
End: 2018-07-10

## 2018-07-10 ENCOUNTER — TRANSFERRED RECORDS (OUTPATIENT)
Dept: HEALTH INFORMATION MANAGEMENT | Facility: CLINIC | Age: 58
End: 2018-07-10

## 2018-07-10 ENCOUNTER — TELEPHONE (OUTPATIENT)
Dept: FAMILY MEDICINE | Facility: CLINIC | Age: 58
End: 2018-07-10

## 2018-07-10 NOTE — TELEPHONE ENCOUNTER
Panel Management Review      Patient has the following on her problem list:     Hypertension   Last three blood pressure readings:  BP Readings from Last 3 Encounters:   06/08/18 124/86   04/24/17 114/60   01/11/17 130/72     Blood pressure: MONITOR    HTN Guidelines:  Age 18-59 BP range:  Less than 140/90  Age 60-85 with Diabetes:  Less than 140/90  Age 60-85 without Diabetes:  less than 150/90      Composite cancer screening  Chart review shows that this patient is due/due soon for the following Pap Smear, Mammogram and Colonoscopy  Summary:    Patient is due/failing the following:   BP CHECK, COLONOSCOPY, MAMMOGRAM, PAP and PHYSICAL    Action needed:   Patient needs office visit for px with pap, mammo, colon.    Type of outreach:    Sent [employer] message.    Questions for provider review:    None                                                                                                                                  Silvino Schaeffer CMA ([facility])     Chart routed to Care Team.

## 2018-07-14 ENCOUNTER — HEALTH MAINTENANCE LETTER (OUTPATIENT)
Age: 58
End: 2018-07-14

## 2018-07-16 PROBLEM — D12.6 ADENOMATOUS POLYP OF COLON, UNSPECIFIED PART OF COLON: Status: ACTIVE | Noted: 2018-07-16

## 2018-07-17 NOTE — TELEPHONE ENCOUNTER
2nd attempt, called and talked to pt.  She will call back next week to schedule; colonoscopy was done last week.  Will wait on results.  Silvino Schaeffer CMA ([facility])

## 2018-11-07 ENCOUNTER — TELEPHONE (OUTPATIENT)
Dept: FAMILY MEDICINE | Facility: CLINIC | Age: 58
End: 2018-11-07

## 2018-11-07 ENCOUNTER — MYC MEDICAL ADVICE (OUTPATIENT)
Dept: FAMILY MEDICINE | Facility: CLINIC | Age: 58
End: 2018-11-07

## 2018-11-07 NOTE — TELEPHONE ENCOUNTER
Panel Management Review      Patient has the following on her problem list:     Hypertension   Last three blood pressure readings:  BP Readings from Last 3 Encounters:   06/08/18 124/86   04/24/17 114/60   01/11/17 130/72     Blood pressure: MONITOR    HTN Guidelines:  Age 18-59 BP range:  Less than 140/90  Age 60-85 with Diabetes:  Less than 140/90  Age 60-85 without Diabetes:  less than 150/90      Composite cancer screening  Chart review shows that this patient is due/due soon for the following Pap Smear and Mammogram  Summary:    Patient is due/failing the following:   BP CHECK, MAMMOGRAM, PAP and PHYSICAL    Action needed:   Patient needs office visit for px with pap, mammo.    Type of outreach:    Sent [employer] message.    Questions for provider review:    None                                                                                                                                Silvino Schaeffer CMA ([facility])     Chart routed to Care Team.

## 2018-11-14 NOTE — TELEPHONE ENCOUNTER
2nd attempt, called and talked to patient.  She will wait until after the holidays to make an apt.  Reach out in 3 months.  Silvino Schaeffer CMA ([facility])

## 2019-03-05 ENCOUNTER — TELEPHONE (OUTPATIENT)
Dept: FAMILY MEDICINE | Facility: CLINIC | Age: 59
End: 2019-03-05

## 2019-03-05 ENCOUNTER — MYC MEDICAL ADVICE (OUTPATIENT)
Dept: FAMILY MEDICINE | Facility: CLINIC | Age: 59
End: 2019-03-05

## 2019-03-05 NOTE — TELEPHONE ENCOUNTER
Type of outreach:  Sent [employer] message.  Health Maintenance Due   Topic Date Due     PREVENTIVE CARE VISIT  1960     HIV SCREEN (SYSTEM ASSIGNED)  04/21/1979     HEPATITIS C SCREENING  04/21/1979     ZOSTER IMMUNIZATION (1 of 2) 04/21/2011     ADVANCE DIRECTIVE PLANNING Q5 YRS  04/21/2016     PAP SCREENING Q3 YR (SYSTEM ASSIGNED)  11/21/2016     PHQ-2 Q1 YR  03/08/2017     MAMMO Q1 YR  04/26/2018     INFLUENZA VACCINE (1) 09/01/2018     Needs fasting px with pap, mammo, BP check.  Silvino Schaeffer CMA ([facility])

## 2019-03-05 NOTE — LETTER
March 12, 2019      Felecia Anne  [address]   [place] [address]        Dear Ms. Felecia Anne,    We care about your health and have reviewed your health plan including your medical conditions, medications, and lab results.  Based on this review, it is recommended that you follow up regarding the following health topic(s):     PREVENTIVE CARE VISIT due on 1960   HEPATITIS C SCREENING due on 04/21/1979   ZOSTER IMMUNIZATION(1 of 2) due on 04/21/2011   PAP SCREENING Q3 YR (SYSTEM ASSIGNED) due on 11/21/2016   MAMMO Q1 YR due on 04/26/2018   INFLUENZA VACCINE(1) due on 09/01/2018     We recommend you take the following action(s):   -schedule a PAP SMEAR EXAM.  This is an important screening for cervical cancer.  Please disregard this reminder if you have had this exam elsewhere within the last year.  It would be helpful for us to have a copy of your recent pap smear report to update your records.     -schedule a MAMMOGRAM.  This is an important screening for breast cancer.  Please disregard this reminder if you have had this exam elsewhere within the last year.  It would be helpful for us to receive a copy of these results so we can update your records.   To schedule a mammogram, please call our services at [phone].     -schedule an ANNUAL FASTING PHYSICAL EXAM.  This is important for total body health where the provider talks about recommended routine healthcare tips.  To be fasting, you should not eat anything for 10-12 hours ahead of time.  Please be sure to drink plenty of water and take all your normal medications as prescribed.  You can have one cup of black coffee (without cream or sugar), but please do not drink any juice or other liquids besides water.       Please call us at the [facility] - [phone] to address the above recommendations.   Thank you for trusting [facility] and we appreciate the opportunity to serve you.  We look forward to supporting your  healthcare needs in the future.     Sincerely,   Silvino Schaeffer CMA ([facility])

## 2019-03-12 NOTE — TELEPHONE ENCOUNTER
2nd attempt, did not check MyChart and had busy signal on phone so sent letter in mail.  Silvino Schaeffer CMA ([facility])

## 2019-03-19 NOTE — TELEPHONE ENCOUNTER
3rd attempt, unable to LM so resent letter in mail.  Silvino Schaeffer CMA ([facility])

## 2019-09-30 ENCOUNTER — HEALTH MAINTENANCE LETTER (OUTPATIENT)
Age: 59
End: 2019-09-30

## 2021-01-15 ENCOUNTER — HEALTH MAINTENANCE LETTER (OUTPATIENT)
Age: 61
End: 2021-01-15

## 2021-07-23 ENCOUNTER — TRANSFERRED RECORDS (OUTPATIENT)
Dept: HEALTH INFORMATION MANAGEMENT | Facility: CLINIC | Age: 61
End: 2021-07-23

## 2021-10-24 ENCOUNTER — HEALTH MAINTENANCE LETTER (OUTPATIENT)
Age: 61
End: 2021-10-24

## 2022-02-13 ENCOUNTER — HEALTH MAINTENANCE LETTER (OUTPATIENT)
Age: 62
End: 2022-02-13

## 2022-10-15 ENCOUNTER — HEALTH MAINTENANCE LETTER (OUTPATIENT)
Age: 62
End: 2022-10-15

## 2023-03-26 ENCOUNTER — HEALTH MAINTENANCE LETTER (OUTPATIENT)
Age: 63
End: 2023-03-26

## 2024-08-26 ENCOUNTER — TRANSFERRED RECORDS (OUTPATIENT)
Dept: HEALTH INFORMATION MANAGEMENT | Facility: CLINIC | Age: 64
End: 2024-08-26
Payer: COMMERCIAL